# Patient Record
Sex: FEMALE | Race: WHITE | NOT HISPANIC OR LATINO | Employment: FULL TIME | URBAN - METROPOLITAN AREA
[De-identification: names, ages, dates, MRNs, and addresses within clinical notes are randomized per-mention and may not be internally consistent; named-entity substitution may affect disease eponyms.]

---

## 2023-08-02 ENCOUNTER — OFFICE VISIT (OUTPATIENT)
Dept: URGENT CARE | Facility: CLINIC | Age: 28
End: 2023-08-02
Payer: COMMERCIAL

## 2023-08-02 ENCOUNTER — OFFICE VISIT (OUTPATIENT)
Dept: ORTHOPEDICS | Facility: CLINIC | Age: 28
End: 2023-08-02
Payer: COMMERCIAL

## 2023-08-02 VITALS
WEIGHT: 185.75 LBS | DIASTOLIC BLOOD PRESSURE: 78 MMHG | HEIGHT: 67 IN | TEMPERATURE: 98 F | BODY MASS INDEX: 29.15 KG/M2 | OXYGEN SATURATION: 97 % | RESPIRATION RATE: 16 BRPM | HEART RATE: 63 BPM | SYSTOLIC BLOOD PRESSURE: 119 MMHG

## 2023-08-02 VITALS — HEIGHT: 66 IN | WEIGHT: 187.19 LBS | BODY MASS INDEX: 30.08 KG/M2

## 2023-08-02 DIAGNOSIS — M54.50 ACUTE MIDLINE LOW BACK PAIN WITHOUT SCIATICA: Primary | ICD-10-CM

## 2023-08-02 DIAGNOSIS — M54.6 PAIN IN THORACIC SPINE: ICD-10-CM

## 2023-08-02 DIAGNOSIS — M54.40 ACUTE MIDLINE LOW BACK PAIN WITH SCIATICA, SCIATICA LATERALITY UNSPECIFIED: ICD-10-CM

## 2023-08-02 DIAGNOSIS — M54.9 BACK PAIN, UNSPECIFIED BACK LOCATION, UNSPECIFIED BACK PAIN LATERALITY, UNSPECIFIED CHRONICITY: Primary | ICD-10-CM

## 2023-08-02 DIAGNOSIS — G89.29 CHRONIC MIDLINE LOW BACK PAIN WITH SCIATICA, SCIATICA LATERALITY UNSPECIFIED: ICD-10-CM

## 2023-08-02 DIAGNOSIS — N30.00 ACUTE CYSTITIS WITHOUT HEMATURIA: ICD-10-CM

## 2023-08-02 DIAGNOSIS — M54.40 CHRONIC MIDLINE LOW BACK PAIN WITH SCIATICA, SCIATICA LATERALITY UNSPECIFIED: ICD-10-CM

## 2023-08-02 LAB
B-HCG UR QL: NEGATIVE
BILIRUB UR QL STRIP: NEGATIVE
CTP QC/QA: YES
GLUCOSE UR QL STRIP: NEGATIVE
KETONES UR QL STRIP: NEGATIVE
LEUKOCYTE ESTERASE UR QL STRIP: POSITIVE
PH, POC UA: 5 (ref 5–8)
POC BLOOD, URINE: NEGATIVE
POC NITRATES, URINE: NEGATIVE
PROT UR QL STRIP: NEGATIVE
SP GR UR STRIP: 1.02 (ref 1–1.03)
UROBILINOGEN UR STRIP-ACNC: ABNORMAL (ref 0.1–1.1)

## 2023-08-02 PROCEDURE — 72070 XR THORACIC SPINE AP LATERAL: ICD-10-PCS | Mod: S$GLB,,, | Performed by: RADIOLOGY

## 2023-08-02 PROCEDURE — 81025 POCT URINE PREGNANCY: ICD-10-PCS | Mod: S$GLB,,,

## 2023-08-02 PROCEDURE — 99999 PR PBB SHADOW E&M-EST. PATIENT-LVL III: ICD-10-PCS | Mod: PBBFAC,,, | Performed by: ORTHOPAEDIC SURGERY

## 2023-08-02 PROCEDURE — 99999 PR PBB SHADOW E&M-EST. PATIENT-LVL III: CPT | Mod: PBBFAC,,, | Performed by: ORTHOPAEDIC SURGERY

## 2023-08-02 PROCEDURE — 99204 PR OFFICE/OUTPT VISIT, NEW, LEVL IV, 45-59 MIN: ICD-10-PCS | Mod: S$GLB,,,

## 2023-08-02 PROCEDURE — 81025 URINE PREGNANCY TEST: CPT | Mod: S$GLB,,,

## 2023-08-02 PROCEDURE — 72070 X-RAY EXAM THORAC SPINE 2VWS: CPT | Mod: S$GLB,,, | Performed by: RADIOLOGY

## 2023-08-02 PROCEDURE — 99204 PR OFFICE/OUTPT VISIT, NEW, LEVL IV, 45-59 MIN: ICD-10-PCS | Mod: S$GLB,,, | Performed by: ORTHOPAEDIC SURGERY

## 2023-08-02 PROCEDURE — 99204 OFFICE O/P NEW MOD 45 MIN: CPT | Mod: S$GLB,,,

## 2023-08-02 PROCEDURE — 81003 URINALYSIS AUTO W/O SCOPE: CPT | Mod: QW,S$GLB,,

## 2023-08-02 PROCEDURE — 72100 XR LUMBAR SPINE 2 OR 3 VIEWS: ICD-10-PCS | Mod: S$GLB,,, | Performed by: RADIOLOGY

## 2023-08-02 PROCEDURE — 81003 POCT URINALYSIS, DIPSTICK, AUTOMATED, W/O SCOPE: ICD-10-PCS | Mod: QW,S$GLB,,

## 2023-08-02 PROCEDURE — 72100 X-RAY EXAM L-S SPINE 2/3 VWS: CPT | Mod: S$GLB,,, | Performed by: RADIOLOGY

## 2023-08-02 PROCEDURE — 99204 OFFICE O/P NEW MOD 45 MIN: CPT | Mod: S$GLB,,, | Performed by: ORTHOPAEDIC SURGERY

## 2023-08-02 PROCEDURE — 87086 URINE CULTURE/COLONY COUNT: CPT

## 2023-08-02 RX ORDER — SULFAMETHOXAZOLE AND TRIMETHOPRIM 800; 160 MG/1; MG/1
1 TABLET ORAL 2 TIMES DAILY
Qty: 14 TABLET | Refills: 0 | Status: SHIPPED | OUTPATIENT
Start: 2023-08-02 | End: 2023-08-09

## 2023-08-02 RX ORDER — CYCLOBENZAPRINE HCL 5 MG
5 TABLET ORAL 3 TIMES DAILY PRN
Qty: 20 TABLET | Refills: 0 | Status: SHIPPED | OUTPATIENT
Start: 2023-08-02

## 2023-08-02 RX ORDER — METHYLPREDNISOLONE 4 MG/1
TABLET ORAL
Qty: 21 TABLET | Refills: 0 | Status: SHIPPED | OUTPATIENT
Start: 2023-08-02

## 2023-08-02 NOTE — PROGRESS NOTES
DATE: 8/2/2023  PATIENT: Anuja Arellano    Attending Physician: Abhijeet Bustos M.D.    CHIEF COMPLAINT: LBP    HISTORY:  Anuja Arellano is a 27 y.o. female presents for initial evaluation of low back pain (Back - 7). The pain has been present for 4 months without trauma. The patient describes the pain as dull but it does not go down legs.  The pain is worse with activity and improved by rest. There is no associated numbness and tingling. There is no subjective weakness. Prior treatments have included Aleve, but no PT, WENDY or surgery.    The Patient denies myelopathic symptoms such as handwriting changes or difficulty with buttons/coins/keys. Denies perineal paresthesias, bowel/bladder dysfunction.    The patient does not smoke, have DM or endorse IVDU. The patient is not on any blood thinners and does not take chronic narcotics. 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    PAST MEDICAL/SURGICAL HISTORY:  History reviewed. No pertinent past medical history.  History reviewed. No pertinent surgical history.    Current Medications:   Current Outpatient Medications:     cyclobenzaprine (FLEXERIL) 5 MG tablet, Take 1 tablet (5 mg total) by mouth 3 (three) times daily as needed for Muscle spasms (back pain)., Disp: 20 tablet, Rfl: 0    sulfamethoxazole-trimethoprim 800-160mg (BACTRIM DS) 800-160 mg Tab, Take 1 tablet by mouth 2 (two) times daily. for 7 days, Disp: 14 tablet, Rfl: 0    methylPREDNISolone (MEDROL DOSEPACK) 4 mg tablet, use as directed, Disp: 21 tablet, Rfl: 0    Social History:   Social History     Socioeconomic History    Marital status: Single   Tobacco Use    Smoking status: Never    Smokeless tobacco: Never       REVIEW OF SYSTEMS:  Constitution: Negative. Negative for chills, fever and night sweats.   Cardiovascular: Negative for chest pain and syncope.   Respiratory: Negative for cough and shortness of breath.   Gastrointestinal: See HPI. Negative for nausea/vomiting.  "Negative for abdominal pain.  Genitourinary: See HPI. Negative for discoloration or dysuria.  Skin: Negative for dry skin, itching and rash.   Hematologic/Lymphatic: negative for bleeding/clotting disorders.   Musculoskeletal: Negative for falls and muscle weakness.   Neurological: See HPI. no history of seizures. no history of cranial surgery or shunts.  Endocrine: Negative for polydipsia, polyphagia and polyuria.   Allergic/Immunologic: Negative for hives and persistent infections.    PHYSICAL EXAMINATION:    Ht 5' 5.5" (1.664 m)   Wt 84.9 kg (187 lb 2.7 oz)   BMI 30.67 kg/m²     General: The patient is a 27 y.o. female in no apparent distress, the patient is orientatied to person, place and time.   Psych: Normal mood and affect  HEENT: Vision grossly intact, hearing intact to the spoken word.  Lungs: Respirations unlabored.  Gait: Normal station and gait, no difficulty with toe or heel walk.   Skin: Dorsal lumbar skin negative for rashes, lesions, hairy patches and surgical scars.  Range of motion: Lumbar range of motion is acceptable. There is lower lumbar tenderness to palpation.  Spinal Balance: Global saggital and coronal spinal balance acceptable, no significant for scoliosis and kyphosis.  Musculoskeletal: No pain with the range of motion of the bilateral hips. No trochanteric tenderness to palpation.  Vascular: Bilateral lower extremities warm and well perfused, Dorsalis pedis pulses 2+ bilaterally.  Neurological: Normal strength and tone in all major motor groups in the bilateral lower extremities. Normal sensation to light touch in the L2-S1 dermatomes bilaterally.  Deep tendon reflexes symmetric 2+ in the bilateral lower extremities.  Negative Babinski bilaterally.    IMAGING:   Today I independently reviewed the following images and my interpretations are as follows:    AP, Lat and Flex/Ex upright L-spine films demonstrate no fractures or listhesis.     Body mass index is 30.67 kg/m².  No results " "found for: "HGBA1C"      ASSESSMENT/PLAN:    Anuja was seen today for establish care, pain and pain.    Diagnoses and all orders for this visit:    Acute midline low back pain without sciatica  -     methylPREDNISolone (MEDROL DOSEPACK) 4 mg tablet; use as directed  -     Ambulatory referral/consult to Physical/Occupational Therapy; Future      Follow up if symptoms worsen or fail to improve.    Patient has acute LBP. I discussed the natural history of their diagnoses as well as surgical and nonsurgical treatment options. I educated the patient on the importance of core/back strengthening, correct posture, bending/lifting ergonomics, and low-impact aerobic exercises (walking, elliptical, and aquatherapy). I prescribed medrol dose pack. Continue medications. I will refer the patient to PT for core/back strengthening. Patient will follow up PRN. Next step is a lumbar MRI.    Abhijeet Bustos MD  Orthopaedic Spine Surgeon  Department of Orthopaedic Surgery  [phone]      "

## 2023-08-02 NOTE — PROGRESS NOTES
"Subjective:      Patient ID: Anuja Arellano is a 27 y.o. female.    Vitals:  height is 5' 6.55" (1.69 m) and weight is 84.2 kg (185 lb 11.8 oz). Her oral temperature is 98.4 °F (36.9 °C). Her blood pressure is 119/78 and her pulse is 63. Her respiration is 16 and oxygen saturation is 97%.     Chief Complaint: Low-back Pain    27-year-old female patient reports of lower back pain that radiates up her lumbar and thoracic spine for the last 4-5 months.  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  Patient denies any known trauma or injury.  Patient reports she has taken off work for the last 2 months due to pain.  Patient reports pain is exacerbated with movement and physical activity.  Patient reports she has been alternating Tylenol and ibuprofen for her pain daily with little relief.  Patient denies any flank pain or CVA tenderness.  Patient denies any urinary symptoms.      Low-back Pain  This is a new problem. The current episode started more than 1 month ago. The problem occurs constantly. The problem has been gradually worsening. Pertinent negatives include no abdominal pain, arthralgias, chest pain, chills, coughing, diaphoresis, fatigue, fever, joint swelling, myalgias, nausea, neck pain, rash, urinary symptoms, vertigo, vomiting or weakness. She has tried NSAIDs (aleive and icy hot) for the symptoms. The treatment provided mild (only helps while resting) relief.       Constitution: Negative for activity change, appetite change, chills, sweating, fatigue and fever.   HENT:  Negative for ear pain, ear discharge, foreign body in ear, tinnitus, hearing loss, dental problem and drooling.    Neck: Negative for neck pain, neck stiffness, painful lymph nodes, neck swelling and degenerative disc disease.   Cardiovascular:  Negative for chest trauma, chest pain, leg swelling and palpitations.   Eyes:  Negative for eye trauma, foreign body in eye, eye discharge, eye " itching, eye pain and eye redness.   Respiratory:  Negative for sleep apnea, chest tightness, cough, sputum production, bloody sputum and asthma.    Gastrointestinal:  Negative for abdominal trauma, abdominal pain, abdominal bloating, history of abdominal surgery, nausea, vomiting, constipation and diarrhea.   Endocrine: hair loss, cold intolerance, heat intolerance and excessive thirst.   Genitourinary:  Negative for dysuria, frequency, urgency, urine decreased, flank pain, bladder incontinence, bed wetting, hematuria, history of kidney stones, painful menstruation, irregular menstruation, missed menses and heavy menstrual bleeding.   Musculoskeletal:  Positive for pain and back pain. Negative for trauma, joint pain, joint swelling, abnormal ROM of joint, arthritis, gout, muscle cramps and muscle ache.   Skin:  Negative for color change, pale, rash, wound, abrasion and laceration.   Allergic/Immunologic: Negative for environmental allergies, seasonal allergies, food allergies, eczema, asthma, immunocompromised state, recurrent sinus infections and chronic cough.   Neurological:  Negative for dizziness, history of vertigo, light-headedness, passing out, facial drooping, disorientation and altered mental status.   Hematologic/Lymphatic: Negative for swollen lymph nodes, easy bruising/bleeding, trouble clotting and history of blood clots. Does not bruise/bleed easily.   Psychiatric/Behavioral:  Negative for altered mental status, disorientation, confusion, agitation, nervous/anxious, sleep disturbance and hallucinations. The patient is not nervous/anxious.       Objective:     Physical Exam   Constitutional: She is oriented to person, place, and time. She appears well-developed. She is cooperative. No distress.   HENT:   Head: Normocephalic and atraumatic.   Nose: Nose normal.   Mouth/Throat: Oropharynx is clear and moist and mucous membranes are normal.   Eyes: Conjunctivae and lids are normal.   Neck: Trachea normal  and phonation normal. Neck supple.   Cardiovascular: Normal rate, regular rhythm, normal heart sounds and normal pulses.   Pulmonary/Chest: Effort normal and breath sounds normal.   Abdominal: Normal appearance and bowel sounds are normal. She exhibits no mass. Soft.   Musculoskeletal:         General: Tenderness present. No swelling, deformity or signs of injury.      Right lower leg: No edema.      Left lower leg: No edema.   Neurological: She is alert and oriented to person, place, and time. She has normal strength and normal reflexes. No sensory deficit.   Skin: Skin is warm, dry, intact and not diaphoretic.   Psychiatric: Her speech is normal and behavior is normal. Judgment and thought content normal.   Nursing note and vitals reviewed.    Results for orders placed or performed in visit on 08/02/23   POCT Urinalysis, Dipstick, Automated, W/O Scope   Result Value Ref Range    POC Blood, Urine Negative Negative    POC Bilirubin, Urine Negative Negative    POC Urobilinogen, Urine norm 0.1 - 1.1    POC Ketones, Urine Negative Negative    POC Protein, Urine Negative Negative    POC Nitrates, Urine Negative Negative    POC Glucose, Urine Negative Negative    pH, UA 5.0 5 - 8    POC Specific Gravity, Urine 1.025 1.003 - 1.029    POC Leukocytes, Urine Positive (A) Negative   POCT urine pregnancy   Result Value Ref Range    POC Preg Test, Ur Negative Negative     Acceptable Yes     XR THORACIC SPINE AP LATERAL    Result Date: 8/2/2023  EXAMINATION: XR THORACIC SPINE AP LATERAL CLINICAL HISTORY: Pain in thoracic spine TECHNIQUE: AP and lateral views of the thoracic spine were performed. COMPARISON: None FINDINGS: Minor dextroscoliosis mid dorsal spine, no paraspinal soft tissue mass, no fracture, subluxation.  Minor anterolateral spondylosis question mid and lower dorsal spine.     See results above. Electronically signed by: Juan Ramon Medina MD Date:    08/02/2023 Time:    12:09    XR LUMBAR SPINE 2 OR  3 VIEWS    Result Date: 8/2/2023  EXAMINATION: XR LUMBAR SPINE 2 OR 3 VIEWS CLINICAL HISTORY: Lumbago with sciatica, unspecified side TECHNIQUE: Lumbar spine 2 or three views COMPARISON: None FINDINGS: Vertebral bodies are intact.  Lumbosacral vertebrae is transitional in nature.  Disc spaces are maintained.     See above Electronically signed by: Scott Angulo MD Date:    08/02/2023 Time:    12:07      Assessment:     1. Back pain, unspecified back location, unspecified back pain laterality, unspecified chronicity    2. Acute cystitis without hematuria    3. Acute midline low back pain with sciatica, sciatica laterality unspecified    4. Pain in thoracic spine    5. Chronic midline low back pain with sciatica, sciatica laterality unspecified        Plan:       Back pain, unspecified back location, unspecified back pain laterality, unspecified chronicity  -     POCT Urinalysis, Dipstick, Automated, W/O Scope  -     POCT urine pregnancy  -     cyclobenzaprine (FLEXERIL) 5 MG tablet; Take 1 tablet (5 mg total) by mouth 3 (three) times daily as needed for Muscle spasms (back pain).  Dispense: 20 tablet; Refill: 0    Acute cystitis without hematuria  -     CULTURE, URINE  -     sulfamethoxazole-trimethoprim 800-160mg (BACTRIM DS) 800-160 mg Tab; Take 1 tablet by mouth 2 (two) times daily. for 7 days  Dispense: 14 tablet; Refill: 0    Acute midline low back pain with sciatica, sciatica laterality unspecified  -     XR LUMBAR SPINE 2 OR 3 VIEWS; Future; Expected date: 08/02/2023    Pain in thoracic spine  -     XR THORACIC SPINE AP LATERAL; Future; Expected date: 08/02/2023    Chronic midline low back pain with sciatica, sciatica laterality unspecified  -     Ambulatory referral/consult to Orthopedics

## 2023-08-03 LAB — BACTERIA UR CULT: NORMAL

## 2023-08-04 ENCOUNTER — TELEPHONE (OUTPATIENT)
Dept: URGENT CARE | Facility: CLINIC | Age: 28
End: 2023-08-04
Payer: COMMERCIAL

## 2023-08-11 ENCOUNTER — OFFICE VISIT (OUTPATIENT)
Dept: OBSTETRICS AND GYNECOLOGY | Facility: CLINIC | Age: 28
End: 2023-08-11
Payer: COMMERCIAL

## 2023-08-11 VITALS
WEIGHT: 186.06 LBS | SYSTOLIC BLOOD PRESSURE: 135 MMHG | DIASTOLIC BLOOD PRESSURE: 91 MMHG | BODY MASS INDEX: 30.49 KG/M2

## 2023-08-11 DIAGNOSIS — Z01.419 WELL WOMAN EXAM: Primary | ICD-10-CM

## 2023-08-11 DIAGNOSIS — Z30.09 ENCOUNTER FOR COUNSELING REGARDING CONTRACEPTION: ICD-10-CM

## 2023-08-11 PROCEDURE — 99385 PREV VISIT NEW AGE 18-39: CPT | Mod: S$GLB,,, | Performed by: STUDENT IN AN ORGANIZED HEALTH CARE EDUCATION/TRAINING PROGRAM

## 2023-08-11 PROCEDURE — 99999 PR PBB SHADOW E&M-EST. PATIENT-LVL II: CPT | Mod: PBBFAC,,, | Performed by: STUDENT IN AN ORGANIZED HEALTH CARE EDUCATION/TRAINING PROGRAM

## 2023-08-11 PROCEDURE — 99385 PR PREVENTIVE VISIT,NEW,18-39: ICD-10-PCS | Mod: S$GLB,,, | Performed by: STUDENT IN AN ORGANIZED HEALTH CARE EDUCATION/TRAINING PROGRAM

## 2023-08-11 PROCEDURE — 99999 PR PBB SHADOW E&M-EST. PATIENT-LVL II: ICD-10-PCS | Mod: PBBFAC,,, | Performed by: STUDENT IN AN ORGANIZED HEALTH CARE EDUCATION/TRAINING PROGRAM

## 2023-08-11 NOTE — PROGRESS NOTES
"History & Physical  Gynecology      SUBJECTIVE:     Chief Complaint: Well Woman Exam       History of Present Illness:  27 y.o. female G0 here for annual GYN visit.   She describes her periods as absent with her Kyleena in place.   She denies vaginal itching, irritation, or discharge.  Patient is sexually active with 1 male partner.  She uses Kyleena for contraception but would like another as this one is   She does not use tobacco, socially consumes alcohol and denies drug use.   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 She reports feeling safe at home.     Patient has no history of abnormal paps  Last Pap: "normal 2 years ago"    She denies a family history of breast or ovarian cancer.        Review of patient's allergies indicates:  No Known Allergies    History reviewed. No pertinent past medical history.  History reviewed. No pertinent surgical history.  OB History    No obstetric history on file.       History reviewed. No pertinent family history.  Social History     Tobacco Use    Smoking status: Never    Smokeless tobacco: Never   Substance Use Topics    Alcohol use: Yes    Drug use: Never       Current Outpatient Medications   Medication Sig    cyclobenzaprine (FLEXERIL) 5 MG tablet Take 1 tablet (5 mg total) by mouth 3 (three) times daily as needed for Muscle spasms (back pain).    methylPREDNISolone (MEDROL DOSEPACK) 4 mg tablet use as directed (Patient not taking: Reported on 2023)     No current facility-administered medications for this visit.       Review of Systems:  Review of Systems   Constitutional:  Negative for chills, fatigue and fever.   HENT:  Negative for congestion.    Eyes:  Negative for visual disturbance.   Respiratory:  Negative for cough and shortness of breath.    Cardiovascular:  Negative for chest pain and palpitations.   Gastrointestinal:  Negative for abdominal distention, abdominal pain, constipation, diarrhea, nausea and vomiting.   Genitourinary:  Negative for " difficulty urinating, dysuria, hematuria, vaginal bleeding and vaginal discharge.   Skin:  Negative for rash.   Neurological:  Negative for dizziness, seizures, light-headedness and headaches.   Hematological:  Does not bruise/bleed easily.   Psychiatric/Behavioral:  Negative for dysphoric mood. The patient is not nervous/anxious.         OBJECTIVE:     Physical Exam:  Vitals:    08/11/23 1319   BP: (!) 135/91      Physical Exam  Vitals and nursing note reviewed. Exam conducted with a chaperone present.   Constitutional:       General: She is not in acute distress.     Appearance: She is well-developed.   HENT:      Head: Normocephalic and atraumatic.   Eyes:      Pupils: Pupils are equal, round, and reactive to light.   Cardiovascular:      Rate and Rhythm: Normal rate and regular rhythm.   Pulmonary:      Effort: Pulmonary effort is normal. No respiratory distress.   Chest:   Breasts:     Right: Normal.      Left: Normal.   Abdominal:      General: There is no distension.      Palpations: Abdomen is soft. There is no mass.      Tenderness: There is no abdominal tenderness. There is no guarding.   Genitourinary:     Comments: SSE: Normal external female genitalia, normal urethral meatus, normal vaginal rugae, normal vaginal mucosa, no vaginal blood in canal, normal physiologic discharge, normal cervix, IUD strings seen, no adnexal masses palpated on bimanual exam.   Musculoskeletal:         General: Normal range of motion.      Cervical back: Normal range of motion and neck supple.   Skin:     General: Skin is warm and dry.   Neurological:      Mental Status: She is alert and oriented to person, place, and time.   Psychiatric:         Behavior: Behavior normal.         Thought Content: Thought content normal.         Judgment: Judgment normal.         ASSESSMENT/PLAN:     1. Well woman exam  - Pap smear - Due next year  - Mammogram - At age 38  - Colonoscopy - At age 43  - Calcium and Vitamin D counseling  14 - 30  years old 1,300mg Ca/d; 600 IU vit D/d  31 - 50 years old 1,000 Ca/d; 600 IU vit D/d  51 - 70 year old: 1,200 Ca/d; 600 IU vit D/d  71+ years old 1,200 Ca/d; 800 IU vit D/d    2. Encounter for counseling regarding contraception  - Device Authorization Order      Zeynep Orozco M.D.  OB/GYN  [facility]

## 2023-08-28 ENCOUNTER — OFFICE VISIT (OUTPATIENT)
Dept: OBSTETRICS AND GYNECOLOGY | Facility: CLINIC | Age: 28
End: 2023-08-28
Payer: COMMERCIAL

## 2023-08-28 VITALS
WEIGHT: 185.88 LBS | BODY MASS INDEX: 30.46 KG/M2 | SYSTOLIC BLOOD PRESSURE: 114 MMHG | DIASTOLIC BLOOD PRESSURE: 82 MMHG

## 2023-08-28 DIAGNOSIS — Z30.430 ENCOUNTER FOR IUD INSERTION: ICD-10-CM

## 2023-08-28 DIAGNOSIS — Z30.432 ENCOUNTER FOR IUD REMOVAL: Primary | ICD-10-CM

## 2023-08-28 PROCEDURE — 58300 INSERT INTRAUTERINE DEVICE: CPT | Mod: S$GLB,,, | Performed by: STUDENT IN AN ORGANIZED HEALTH CARE EDUCATION/TRAINING PROGRAM

## 2023-08-28 PROCEDURE — 58301 REMOVAL OF IUD: ICD-10-PCS | Mod: S$GLB,,, | Performed by: STUDENT IN AN ORGANIZED HEALTH CARE EDUCATION/TRAINING PROGRAM

## 2023-08-28 PROCEDURE — 58301 REMOVE INTRAUTERINE DEVICE: CPT | Mod: S$GLB,,, | Performed by: STUDENT IN AN ORGANIZED HEALTH CARE EDUCATION/TRAINING PROGRAM

## 2023-08-28 PROCEDURE — 58300 INSERTION OF IUD: ICD-10-PCS | Mod: S$GLB,,, | Performed by: STUDENT IN AN ORGANIZED HEALTH CARE EDUCATION/TRAINING PROGRAM

## 2023-08-28 PROCEDURE — 99499 UNLISTED E&M SERVICE: CPT | Mod: S$GLB,,, | Performed by: STUDENT IN AN ORGANIZED HEALTH CARE EDUCATION/TRAINING PROGRAM

## 2023-08-28 PROCEDURE — 99499 NO LOS: ICD-10-PCS | Mod: S$GLB,,, | Performed by: STUDENT IN AN ORGANIZED HEALTH CARE EDUCATION/TRAINING PROGRAM

## 2023-08-28 PROCEDURE — 99999 PR PBB SHADOW E&M-EST. PATIENT-LVL III: ICD-10-PCS | Mod: PBBFAC,,, | Performed by: STUDENT IN AN ORGANIZED HEALTH CARE EDUCATION/TRAINING PROGRAM

## 2023-08-28 PROCEDURE — 99999 PR PBB SHADOW E&M-EST. PATIENT-LVL III: CPT | Mod: PBBFAC,,, | Performed by: STUDENT IN AN ORGANIZED HEALTH CARE EDUCATION/TRAINING PROGRAM

## 2023-08-28 RX ORDER — METRONIDAZOLE 500 MG/1
1 TABLET ORAL 2 TIMES DAILY
COMMUNITY

## 2023-08-28 RX ORDER — LORATADINE 10 MG
TABLET,CHEWABLE ORAL
COMMUNITY

## 2023-08-28 RX ORDER — FLUCONAZOLE 150 MG/1
1 TABLET ORAL DAILY
COMMUNITY

## 2023-08-28 NOTE — PROCEDURES
Removal of IUD    Date/Time: 8/28/2023 9:15 AM    Performed by: Zeynep Orozco MD  Authorized by: Zeynep Orozco MD    Consent obtained:  Verbal and written  Consent given by:  Patient  Procedure risks and benefits discussed: yes    Patient questions answered: yes    Patient agrees, verbalizes understanding, and wants to proceed: yes    Educational handouts given: yes    Instructions and paperwork completed: yes    Implant grasped by: forceps  Other reason for removal:  Device Expiration  Removed with no complications: yes

## 2023-08-28 NOTE — PROCEDURES
Insertion of IUD    Date/Time: 8/28/2023 9:15 AM    Performed by: Zeynep Orozco MD  Authorized by: Zeynep Orozco MD    Consent:     Consent given by:  Patient    Procedure risks and benefits discussed: yes      Patient questions answered: yes      Patient agrees, verbalizes understanding, and wants to proceed: yes      Educational handouts given: yes      Instructions and paperwork completed: yes    Procedure:     Pelvic exam performed: yes      Negative urine pregnancy test: yes      Cervix cleaned and prepped: yes      Speculum placed in vagina: yes      Uterus sounded: yes      Uterus sound depth (cm):  7    IUD inserted with no complications: yes      IUD type:  Kyleena    Strings trimmed: yes    17.5 mcg levonorgestreL 17.5 mcg/24 hrs (5 yrs) 19.5 mg     Post-procedure:     Patient tolerated procedure well: yes